# Patient Record
Sex: MALE | Race: WHITE | NOT HISPANIC OR LATINO | Employment: UNEMPLOYED | URBAN - NONMETROPOLITAN AREA
[De-identification: names, ages, dates, MRNs, and addresses within clinical notes are randomized per-mention and may not be internally consistent; named-entity substitution may affect disease eponyms.]

---

## 2018-08-16 ENCOUNTER — APPOINTMENT (OUTPATIENT)
Dept: LAB | Age: 33
End: 2018-08-16

## 2020-07-10 ENCOUNTER — APPOINTMENT (OUTPATIENT)
Age: 35
Setting detail: DERMATOLOGY
End: 2020-07-10

## 2020-07-10 VITALS — RESPIRATION RATE: 16 BRPM | HEIGHT: 72 IN | WEIGHT: 165 LBS

## 2020-07-10 DIAGNOSIS — L663 OTHER SPECIFIED DISEASES OF HAIR AND HAIR FOLLICLES: ICD-10-CM

## 2020-07-10 DIAGNOSIS — L738 OTHER SPECIFIED DISEASES OF HAIR AND HAIR FOLLICLES: ICD-10-CM

## 2020-07-10 PROBLEM — L30.9 DERMATITIS, UNSPECIFIED: Status: ACTIVE | Noted: 2020-07-10

## 2020-07-10 PROCEDURE — 99202 OFFICE O/P NEW SF 15 MIN: CPT

## 2020-07-10 PROCEDURE — OTHER PRESCRIPTION: OTHER

## 2020-07-10 PROCEDURE — OTHER ORDER TESTS: OTHER

## 2020-07-10 PROCEDURE — OTHER COUNSELING: OTHER

## 2020-07-10 PROCEDURE — OTHER ADDITIONAL NOTES: OTHER

## 2020-07-10 RX ORDER — CLINDAMYCIN PHOSPHATE 10 MG/G
1% GEL TOPICAL BID PRN
Qty: 1 | Refills: 2 | Status: ERX | COMMUNITY
Start: 2020-07-10

## 2020-07-10 ASSESSMENT — LOCATION SIMPLE DESCRIPTION DERM
LOCATION SIMPLE: RIGHT THIGH
LOCATION SIMPLE: LEFT THIGH

## 2020-07-10 ASSESSMENT — LOCATION DETAILED DESCRIPTION DERM
LOCATION DETAILED: RIGHT ANTERIOR DISTAL THIGH
LOCATION DETAILED: LEFT ANTERIOR DISTAL THIGH

## 2020-07-10 ASSESSMENT — LOCATION ZONE DERM: LOCATION ZONE: LEG

## 2020-07-10 NOTE — PROCEDURE: ADDITIONAL NOTES
Detail Level: Detailed
Additional Notes: -Explained that this could be a bacterial folliculitis or a “sterile” folliculitis.\\n-Advised that the culture should tell us and we can try to treat while waiting for results.\\n-Patient prefers not to take ABX and declines oral ABX.\\n-Will send a topical antibiotic.

## 2020-07-10 NOTE — PROCEDURE: COUNSELING
Detail Level: Zone
Patient Specific Counseling (Will Not Stick From Patient To Patient): -Discussed the patient’s habit of picking at the lesions and discouraged him from continuing to do so.

## 2020-07-27 ENCOUNTER — RX ONLY (RX ONLY)
Age: 35
End: 2020-07-27

## 2020-07-27 RX ORDER — CIPROFLOXACIN 500 MG/1
500 MG TABLET, FILM COATED ORAL BID
Qty: 20 | Refills: 0 | Status: ERX | COMMUNITY
Start: 2020-07-27

## 2022-01-14 ENCOUNTER — APPOINTMENT (OUTPATIENT)
Dept: GENERAL RADIOLOGY | Facility: CLINIC | Age: 37
End: 2022-01-14
Attending: EMERGENCY MEDICINE
Payer: COMMERCIAL

## 2022-01-14 ENCOUNTER — HOSPITAL ENCOUNTER (EMERGENCY)
Facility: CLINIC | Age: 37
Discharge: HOME | End: 2022-01-14
Attending: EMERGENCY MEDICINE | Admitting: EMERGENCY MEDICINE
Payer: COMMERCIAL

## 2022-01-14 VITALS
SYSTOLIC BLOOD PRESSURE: 125 MMHG | DIASTOLIC BLOOD PRESSURE: 84 MMHG | OXYGEN SATURATION: 99 % | HEART RATE: 66 BPM | WEIGHT: 178 LBS | TEMPERATURE: 97.5 F | RESPIRATION RATE: 18 BRPM

## 2022-01-14 DIAGNOSIS — R07.89 ATYPICAL CHEST PAIN: ICD-10-CM

## 2022-01-14 LAB
ANION GAP SERPL CALCULATED.3IONS-SCNC: 3 MMOL/L (ref 3–14)
ATRIAL RATE - MUSE: 76 BPM
BASOPHILS # BLD AUTO: 0 10E3/UL (ref 0–0.2)
BASOPHILS NFR BLD AUTO: 1 %
BUN SERPL-MCNC: 11 MG/DL (ref 7–30)
CALCIUM SERPL-MCNC: 8.6 MG/DL (ref 8.5–10.1)
CHLORIDE BLD-SCNC: 105 MMOL/L (ref 94–109)
CO2 SERPL-SCNC: 29 MMOL/L (ref 20–32)
CREAT SERPL-MCNC: 1.01 MG/DL (ref 0.66–1.25)
DIASTOLIC BLOOD PRESSURE - MUSE: NORMAL MMHG
EOSINOPHIL # BLD AUTO: 0.3 10E3/UL (ref 0–0.7)
EOSINOPHIL NFR BLD AUTO: 7 %
ERYTHROCYTE [DISTWIDTH] IN BLOOD BY AUTOMATED COUNT: 11.9 % (ref 10–15)
GFR SERPL CREATININE-BSD FRML MDRD: >90 ML/MIN/1.73M2
GLUCOSE BLD-MCNC: 90 MG/DL (ref 70–99)
HCT VFR BLD AUTO: 42.4 % (ref 40–53)
HGB BLD-MCNC: 14.2 G/DL (ref 13.3–17.7)
HOLD SPECIMEN: NORMAL
IMM GRANULOCYTES # BLD: 0 10E3/UL
IMM GRANULOCYTES NFR BLD: 0 %
INTERPRETATION ECG - MUSE: NORMAL
LYMPHOCYTES # BLD AUTO: 2.2 10E3/UL (ref 0.8–5.3)
LYMPHOCYTES NFR BLD AUTO: 44 %
MCH RBC QN AUTO: 29.5 PG (ref 26.5–33)
MCHC RBC AUTO-ENTMCNC: 33.5 G/DL (ref 31.5–36.5)
MCV RBC AUTO: 88 FL (ref 78–100)
MONOCYTES # BLD AUTO: 0.4 10E3/UL (ref 0–1.3)
MONOCYTES NFR BLD AUTO: 8 %
NEUTROPHILS # BLD AUTO: 2 10E3/UL (ref 1.6–8.3)
NEUTROPHILS NFR BLD AUTO: 40 %
NRBC # BLD AUTO: 0 10E3/UL
NRBC BLD AUTO-RTO: 0 /100
P AXIS - MUSE: 10 DEGREES
PLATELET # BLD AUTO: 217 10E3/UL (ref 150–450)
POTASSIUM BLD-SCNC: 3.7 MMOL/L (ref 3.4–5.3)
PR INTERVAL - MUSE: 150 MS
QRS DURATION - MUSE: 88 MS
QT - MUSE: 392 MS
QTC - MUSE: 441 MS
R AXIS - MUSE: 70 DEGREES
RBC # BLD AUTO: 4.81 10E6/UL (ref 4.4–5.9)
SODIUM SERPL-SCNC: 137 MMOL/L (ref 133–144)
SYSTOLIC BLOOD PRESSURE - MUSE: NORMAL MMHG
T AXIS - MUSE: 54 DEGREES
TROPONIN I SERPL HS-MCNC: 7 NG/L
VENTRICULAR RATE- MUSE: 76 BPM
WBC # BLD AUTO: 4.9 10E3/UL (ref 4–11)

## 2022-01-14 PROCEDURE — 85025 COMPLETE CBC W/AUTO DIFF WBC: CPT | Performed by: EMERGENCY MEDICINE

## 2022-01-14 PROCEDURE — 71046 X-RAY EXAM CHEST 2 VIEWS: CPT

## 2022-01-14 PROCEDURE — 80048 BASIC METABOLIC PNL TOTAL CA: CPT | Performed by: EMERGENCY MEDICINE

## 2022-01-14 PROCEDURE — 84484 ASSAY OF TROPONIN QUANT: CPT | Performed by: EMERGENCY MEDICINE

## 2022-01-14 PROCEDURE — 93005 ELECTROCARDIOGRAM TRACING: CPT

## 2022-01-14 PROCEDURE — 99285 EMERGENCY DEPT VISIT HI MDM: CPT | Mod: 25

## 2022-01-14 PROCEDURE — 36415 COLL VENOUS BLD VENIPUNCTURE: CPT | Performed by: EMERGENCY MEDICINE

## 2022-01-14 ASSESSMENT — ENCOUNTER SYMPTOMS
NUMBNESS: 1
DIARRHEA: 1
SHORTNESS OF BREATH: 0
NAUSEA: 1
LIGHT-HEADEDNESS: 0

## 2022-01-15 NOTE — ED PROVIDER NOTES
"  History     Chief Complaint:  Chest Pain     The history is provided by the patient.      Ben Braga is a 36 year old male with history of prostatitis who presents with 1 week of left-sided chest pain. The pain has been intermittent but it has recently become more constant. The pain is more noticeable at rest and absent with exercise and other exertion. He notes that the pain sometimes feels like a \"fluttering\" in his chest. He reports that he saw his primary doctor about this 4 days ago, and nothing concerning was discovered. The pain persisted and therefore he called a nurse line today. He was told that he should be seen in clinic in a few days, but he was told to visit the ED if any new or worsening symptoms developed. This evening, he developed tingling/numbness in his left arm, which prompts his ED visit today. He also notes some increased belching, loose stools, and nausea which are new. Ben does note that he has been experiencing many life changes recently including a new career and his wife having a baby 4 days ago. No shortness of breath or lightheadedness.    Review of Systems   Respiratory: Negative for shortness of breath.    Cardiovascular: Positive for chest pain.   Gastrointestinal: Positive for diarrhea (loose stools) and nausea.   Neurological: Positive for numbness. Negative for light-headedness.   All other systems reviewed and are negative.    Allergies:  The patient has no known allergies.     Medications:  The patient is currently on no regular medications.    Past Medical History:     Prostatitis       Family History:    Mother: giant cell arteritis, vasculitis    Social History:  The patient presents to the ED alone.  The patient is .    Physical Exam     Patient Vitals for the past 24 hrs:   BP Temp Temp src Pulse Resp SpO2 Weight   01/14/22 2042 125/84 97.5  F (36.4  C) Oral 66 18 99 % 80.7 kg (178 lb)     Physical Exam  General/Appearance: appears stated age, " well-groomed, appears comfortable  Eyes: EOMI, no scleral injection, no icterus  ENT: MMM  Neck: supple, nl ROM, no stiffness  Cardiovascular: RRR, nl S1S2, no m/r/g, 2+ pulses in all 4 extremities, cap refill <2sec  Respiratory: CTAB, good air movement throughout, no wheezes/rhonchi/rales, no increased WOB, no retractions  MSK: LOPEZ, good tone, no bony abnormality  Skin: warm and well-perfused, no rash, no edema, no ecchymosis, nl turgor  Neuro: GCS 15, alert and oriented, no gross focal neuro deficits  Psych: interacts appropriately  Heme: no petechia, no purpura, no active bleeding    Emergency Department Course     ECG:  ECG taken at 2040, ECG read at 2050  Normal sinus rhythm  Normal ECG  Rate 76 bpm. CO interval 150 ms. QRS duration 88 ms. QT/QTc 392/441 ms. P-R-T axes 10 70 54.     Imaging:  XR Chest 2 Views   Final Result   IMPRESSION: PA and lateral views of the chest were obtained. Cardiomediastinal silhouette is within normal limits. No suspicious focal pulmonary opacities. No significant pleural effusion or pneumothorax.           Report per radiology    Laboratory:  Labs Ordered and Resulted from Time of ED Arrival to Time of ED Departure   BASIC METABOLIC PANEL - Normal       Result Value    Sodium 137      Potassium 3.7      Chloride 105      Carbon Dioxide (CO2) 29      Anion Gap 3      Urea Nitrogen 11      Creatinine 1.01      Calcium 8.6      Glucose 90      GFR Estimate >90     TROPONIN I - Normal    Troponin I High Sensitivity 7     CBC WITH PLATELETS AND DIFFERENTIAL    WBC Count 4.9      RBC Count 4.81      Hemoglobin 14.2      Hematocrit 42.4      MCV 88      MCH 29.5      MCHC 33.5      RDW 11.9      Platelet Count 217      % Neutrophils 40      % Lymphocytes 44      % Monocytes 8      % Eosinophils 7      % Basophils 1      % Immature Granulocytes 0      NRBCs per 100 WBC 0      Absolute Neutrophils 2.0      Absolute Lymphocytes 2.2      Absolute Monocytes 0.4      Absolute Eosinophils 0.3       Absolute Basophils 0.0      Absolute Immature Granulocytes 0.0      Absolute NRBCs 0.0        Emergency Department Course:    Reviewed:  I reviewed nursing notes, vitals, past medical history, Care Everywhere    Assessments:  2038 I obtained history and examined the patient as noted above.   2123 I rechecked the patient and explained findings.     Disposition:  The patient was discharged to home.     Impression & Plan     Medical Decision Making:  This pt presents with chest pain of unclear etiology.  At this time I do not suspect that there is an acute/dangerous pathology for the chest pain.  They have no significant personal/family history of cardiac ds.  They have no significant cardiac risk factors.  Their EKG and CXR were unremarkable. Their high-sensitivity trop, drawn over 6 hours after sxs onset, was neg and I feel their risk is low-enough to not warrant delta trops.  I have also considered PE but they are PERC neg. There are no focal infiltrates, effusions, pulm edema, or evidence of PTX on CXR. The pt has not had recent fevers or viral infections to suggest pericarditis.  They are at low risk for aortic pathology and have nl aortic contour on CXR.  They have not had recent chest trauma.  All of this was discussed with the pt and they were reassurred.  I have advised them to follow-up with their PCP in the next 3 days to get further evaluation, and to return to the ED sooner if their CP continues/worsens, they develop severe SOB/fevers/lightheadedness, or they develop any other new and concerning sxs. At this point the pt is HD stable and appropriate for discharge.      Diagnosis:    ICD-10-CM    1. Atypical chest pain  R07.89        Discharge Medications:  New Prescriptions    No medications on file     Scribe Disclosure:  I, Bonnie Galdamez, am serving as a scribe at 8:38 PM on 1/14/2022 to document services personally performed by Michelle Liriano MD based on my observations and the provider's  statements to me.        Michelle Liriano MD  01/14/22 2125       Michelle Liriano MD  01/14/22 2125

## 2022-01-15 NOTE — ED TRIAGE NOTES
CP for 1 week now going into [facility] Talked with  and told to come to ER.  Wife just had a baby recently.  No previous heart HX

## 2025-04-02 ENCOUNTER — OFFICE VISIT (OUTPATIENT)
Dept: FAMILY MEDICINE | Facility: CLINIC | Age: 40
End: 2025-04-02
Payer: COMMERCIAL

## 2025-04-02 DIAGNOSIS — Z53.9 NO SHOW: Primary | ICD-10-CM
